# Patient Record
Sex: MALE | Race: WHITE
[De-identification: names, ages, dates, MRNs, and addresses within clinical notes are randomized per-mention and may not be internally consistent; named-entity substitution may affect disease eponyms.]

---

## 2017-10-11 ENCOUNTER — HOSPITAL ENCOUNTER (OUTPATIENT)
Dept: HOSPITAL 92 - LABBT | Age: 52
Discharge: HOME | End: 2017-10-11
Attending: ORTHOPAEDIC SURGERY
Payer: COMMERCIAL

## 2017-10-11 DIAGNOSIS — M75.101: ICD-10-CM

## 2017-10-11 DIAGNOSIS — Z01.818: Primary | ICD-10-CM

## 2017-10-11 LAB
ANION GAP SERPL CALC-SCNC: 13 MMOL/L (ref 10–20)
BASOPHILS # BLD AUTO: 0.1 THOU/UL (ref 0–0.2)
BASOPHILS NFR BLD AUTO: 0.9 % (ref 0–1)
BUN SERPL-MCNC: 13 MG/DL (ref 8.4–25.7)
CALCIUM SERPL-MCNC: 9.2 MG/DL (ref 7.8–10.44)
CHLORIDE SERPL-SCNC: 101 MMOL/L (ref 98–107)
CO2 SERPL-SCNC: 26 MMOL/L (ref 22–29)
CREAT CL PREDICTED SERPL C-G-VRATE: 0 ML/MIN (ref 70–130)
EOSINOPHIL # BLD AUTO: 0.3 THOU/UL (ref 0–0.7)
EOSINOPHIL NFR BLD AUTO: 2.9 % (ref 0–10)
HCT VFR BLD CALC: 55.1 % (ref 42–52)
LYMPHOCYTES # BLD: 3.2 THOU/UL (ref 1.2–3.4)
LYMPHOCYTES NFR BLD AUTO: 26.4 % (ref 21–51)
MONOCYTES # BLD AUTO: 0.9 THOU/UL (ref 0.11–0.59)
MONOCYTES NFR BLD AUTO: 7.4 % (ref 0–10)
NEUTROPHILS # BLD AUTO: 7.5 THOU/UL (ref 1.4–6.5)
RBC # BLD AUTO: 6.16 MILL/UL (ref 4.7–6.1)
WBC # BLD AUTO: 12 THOU/UL (ref 4.8–10.8)

## 2017-10-11 PROCEDURE — 85025 COMPLETE CBC W/AUTO DIFF WBC: CPT

## 2017-10-11 PROCEDURE — 80048 BASIC METABOLIC PNL TOTAL CA: CPT

## 2017-10-11 PROCEDURE — 93010 ELECTROCARDIOGRAM REPORT: CPT

## 2017-10-11 PROCEDURE — 93005 ELECTROCARDIOGRAM TRACING: CPT

## 2017-10-13 ENCOUNTER — HOSPITAL ENCOUNTER (OUTPATIENT)
Dept: HOSPITAL 92 - SDC | Age: 52
End: 2017-10-13
Attending: ORTHOPAEDIC SURGERY
Payer: COMMERCIAL

## 2017-10-13 VITALS — BODY MASS INDEX: 44.1 KG/M2

## 2017-10-13 DIAGNOSIS — M75.41: Primary | ICD-10-CM

## 2017-10-13 DIAGNOSIS — S46.111A: ICD-10-CM

## 2017-10-13 DIAGNOSIS — Z80.9: ICD-10-CM

## 2017-10-13 DIAGNOSIS — Z79.899: ICD-10-CM

## 2017-10-13 DIAGNOSIS — Z96.1: ICD-10-CM

## 2017-10-13 DIAGNOSIS — K21.9: ICD-10-CM

## 2017-10-13 DIAGNOSIS — I10: ICD-10-CM

## 2017-10-13 DIAGNOSIS — E78.00: ICD-10-CM

## 2017-10-13 DIAGNOSIS — Z98.890: ICD-10-CM

## 2017-10-13 DIAGNOSIS — Z79.84: ICD-10-CM

## 2017-10-13 DIAGNOSIS — Z82.3: ICD-10-CM

## 2017-10-13 DIAGNOSIS — Z95.1: ICD-10-CM

## 2017-10-13 DIAGNOSIS — Z90.49: ICD-10-CM

## 2017-10-13 DIAGNOSIS — Z79.82: ICD-10-CM

## 2017-10-13 DIAGNOSIS — M75.101: ICD-10-CM

## 2017-10-13 DIAGNOSIS — Z88.1: ICD-10-CM

## 2017-10-13 PROCEDURE — A4306 DRUG DELIVERY SYSTEM <=50 ML: HCPCS

## 2017-10-13 PROCEDURE — 0LQ10ZZ REPAIR RIGHT SHOULDER TENDON, OPEN APPROACH: ICD-10-PCS | Performed by: ORTHOPAEDIC SURGERY

## 2017-10-13 PROCEDURE — 0LS30ZZ REPOSITION RIGHT UPPER ARM TENDON, OPEN APPROACH: ICD-10-PCS | Performed by: ORTHOPAEDIC SURGERY

## 2017-10-13 PROCEDURE — 0RHJ04Z INSERTION OF INTERNAL FIXATION DEVICE INTO RIGHT SHOULDER JOINT, OPEN APPROACH: ICD-10-PCS | Performed by: ORTHOPAEDIC SURGERY

## 2017-10-13 PROCEDURE — C1713 ANCHOR/SCREW BN/BN,TIS/BN: HCPCS

## 2017-10-13 NOTE — OP
DATE OF PROCEDURE:  10/13/2017

 

PREOPERATIVE DIAGNOSES:  Right shoulder impingement with rotator cuff tear and degenerative labral t
ear leading to biceps instability.

 

POSTOPERATIVE DIAGNOSES:  Right shoulder impingement with rotator cuff tear and degenerative labral 
tear leading to biceps instability.

 

PROCEDURE PERFORMED:  Right shoulder open subacromial decompression followed by open rotator cuff re
pair, followed by open biceps tenodesis.

 

SURGEON:  Alonzo Carey M.D.

 

ASSISTANT:  Sinan Sanders PA-C.

 

BLOOD LOSS:  Approximately 50 mL.

 

COMPLICATIONS:  None.

 

IMPLANTS:  We used 2 double loaded titanium anchors as well as two 4.75 BioComposite SwiveLocks with
 rotator cuff tear and we used a 7 x 23 BioComposite Bio-Tenodesis screw.

 

DISPOSITION:  He did go to recovery in stable condition.

 

CONDITION:  He had general anesthetic, he also had a block.

 

INDICATIONS:  This is a 52-year-old male who has been having problems with his right shoulder with p
ain and weakness and he has failed nonoperative treatment at this time.  At this time, he wished his
 rotator cuff repaired.

 

DESCRIPTION OF PROCEDURE:  After all appropriate consent forms were explained and signed, he was lane
en to the operating room and at this time and was given a general anesthetic.  Once the level of ane
sthesia was appropriate, he was placed in modified beach chair position with all bony prominences we
ll-padded.  At this time, the right shoulder and upper extremity were then prepped and draped in the
 standard surgical fashion.  Incision line was made with a 10 blade down through skin.  Bovie was us
ed to coagulate any brisk venous bleeding.  At this time, full thickness deltoid and deltoid fascia 
was taken off the anterior acromion and a Hector was used to protect the underlying rotator cuff, 
a saw was used to perform anterior inferior acromioplasty.  Rasp was used to smooth off the edges.  
At this time, we then removed our bursa which was on top of the underlying rotator cuff.  Once this 
was removed, we were able to visualize our cuff tear.  All the edges were cleaned up with a knife as
 well as a rongeur.  There was an undersurface delamination noted in the posterior aspect of the sup
raspinatus going on into the infraspinatus.  A portion of this was debrided and we then passed some 
0 Vicryl sutures in mattress fashion to sew the bottom layer to the top layer closing this down.  At
 this time, we then turned our attention to find the biceps tendon.  This was tagged, cut out from i
nside the joint.  We then opened up the bicipital groove, coagulating any brisk venous bleeding.  We
 then sewed and cut off the intra-articular portion of the biceps tendon.  We then placed our pin, d
rilled with a 7 mm reamer to a depth of 25 and placed our BioComposite Bio-Tenodesis screw in standa
rd fashion.  Stitches were tied over top so the screw could not back out and these were then cut.  A
t this time, our biceps was tenodesed and we then turned our attention to repairing our rotator cuff
.  The greater tuberosity had all soft tissue removed from it to get to good bleeding punctate bone.
  We then thoroughly irrigated and dried.  We then placed two double loaded titanium anchors right o
ff the articular surface and passed these threads through the rotator cuff in mattress fashion using
 a free needle.  These were then sequentially tied and we then took 4 strands each and placed two 4.
75 BioComposite swivel locks for double row repair laterally down the arm.  This was punched and fransisco
riana in standard fashion and this gave us a nice double row repair.  We then took the arm through ful
l range of motion and found it to be nice and stable and have free range of motion.  We then thoroug
hly irrigated and dried.  We then used multiple interrupted #1 Ethibond sutures to repair our deltoi
d through the acromion bone.  We then ran a large Vicryl to close the remaining deltoid fascia, 2-0 
Vicryl and surgical staples were then used to close skin.  Bulky sterile dressing was applied.  The 
patient was awakened and taken to the recovery room in stable condition.  All counts were correct at
 the end of the case.  He received preoperative IV antibiotics.

## 2020-08-05 ENCOUNTER — HOSPITAL ENCOUNTER (OUTPATIENT)
Dept: HOSPITAL 92 - RAD | Age: 55
Discharge: HOME | End: 2020-08-05
Attending: ORTHOPAEDIC SURGERY
Payer: COMMERCIAL

## 2020-08-05 DIAGNOSIS — M19.012: ICD-10-CM

## 2020-08-05 DIAGNOSIS — M75.122: ICD-10-CM

## 2020-08-05 DIAGNOSIS — M25.512: Primary | ICD-10-CM

## 2020-08-05 PROCEDURE — 23350 INJECTION FOR SHOULDER X-RAY: CPT

## 2020-08-05 NOTE — CT
LEFT SHOULDER CT SCAN POST ARTHROGRAM CONTRAST:

 

HISTORY: 

Left shoulder pain.

 

Examination performed following a left shoulder arthrogram.

 

FINDINGS: 

Significant arthrosis changes of the AC joint with undersurface spurring of the distal clavicle and a
 prominent undersurface spur of the lateral acromion with some mild lateral acromion downsloping.  Th
ere is a complete full-thickness moderately retracted supraspinatus tendon tear initially at the foot
plate and then extending more towards the level of the magic angle with retraction back to the level 
of the mid humeral dome.  There also appears to be an undersurface and minimally delaminating tear of
 the infraspinatus tendon.  The intraarticular biceps tendon is somewhat poorly defined.  Minimal thi
ckening of the subscapularis tendon possibly representing some tendinopathy.  There is some narrowing
 of the glenohumeral joint space, evidence for arthrosis.  Moderate muscle volume loss of the suprasp
inatus muscle.  

 

IMPRESSION: 

Full-thickness retracted tear of the supraspinatus tendon with an undersurface and delaminating tear 
of the infraspinatus tendon.

 

Fairly marked muscle volume loss of the supraspinatus muscle.

 

Evidence for some narrowing of the glenohumeral joint space, evidence for arthrosis.

 

Evidence for some thickening of the subscapularis tendon possibly representing some tendinopathy.

 

Poorly defined biceps tendon.

 

Evidence for some blunting and distinction of the glenoid labrum.

 

POS: AH

## 2020-08-05 NOTE — RAD
Arthrogram left shoulder



HISTORY: Internal derangement.



FINDINGS: After explaining the procedure and answering all questions, the anterior aspect the left sh
oulder was prepped and draped in usual sterile fashion.



Sterile technique, buffered local anesthesia, fluoroscopic guidance, and an anterior approach were us
ed to carefully advance the tip of a 22-gauge spinal needle to the joint capsule at the level of

the humeral head.



A total of 8 cc liquid containing normal saline, 1% lidocaine, iodinated contrast, and small amount o
f epinephrine were then instilled into the joint capsule under fluoroscopic control. Needle was

removed. Contrast appeared to extend beyond the expected confines of the rotator cuff into the subacr
omial bursa.



Fluoroscopy time 0.4 minutes.



Patient tolerated procedure well and was transferred to CT in good condition for further imaging.



  



IMPRESSION :

Technically successful left shoulder arthrogram with evidence of full-thickness rotator cuff tear. CT
 is pending.



Reported By: DARRYL Mancia 

Electronically Signed:  8/5/2020 1:29 PM

## 2020-08-28 ENCOUNTER — HOSPITAL ENCOUNTER (OUTPATIENT)
Dept: HOSPITAL 92 - SDC | Age: 55
Discharge: HOME | End: 2020-08-28
Attending: ORTHOPAEDIC SURGERY
Payer: COMMERCIAL

## 2020-08-28 VITALS — BODY MASS INDEX: 43.1 KG/M2

## 2020-08-28 DIAGNOSIS — K21.9: ICD-10-CM

## 2020-08-28 DIAGNOSIS — M65.812: ICD-10-CM

## 2020-08-28 DIAGNOSIS — Z98.890: ICD-10-CM

## 2020-08-28 DIAGNOSIS — I10: ICD-10-CM

## 2020-08-28 DIAGNOSIS — Z95.1: ICD-10-CM

## 2020-08-28 DIAGNOSIS — M75.102: Primary | ICD-10-CM

## 2020-08-28 DIAGNOSIS — Z88.1: ICD-10-CM

## 2020-08-28 DIAGNOSIS — S43.432A: ICD-10-CM

## 2020-08-28 DIAGNOSIS — S46.112A: ICD-10-CM

## 2020-08-28 DIAGNOSIS — Z79.899: ICD-10-CM

## 2020-08-28 DIAGNOSIS — Z79.82: ICD-10-CM

## 2020-08-28 DIAGNOSIS — F17.210: ICD-10-CM

## 2020-08-28 DIAGNOSIS — M25.812: ICD-10-CM

## 2020-08-28 PROCEDURE — 0LQ24ZZ REPAIR LEFT SHOULDER TENDON, PERCUTANEOUS ENDOSCOPIC APPROACH: ICD-10-PCS | Performed by: ORTHOPAEDIC SURGERY

## 2020-08-28 PROCEDURE — C1713 ANCHOR/SCREW BN/BN,TIS/BN: HCPCS

## 2020-08-28 PROCEDURE — 0RNK4ZZ RELEASE LEFT SHOULDER JOINT, PERCUTANEOUS ENDOSCOPIC APPROACH: ICD-10-PCS | Performed by: ORTHOPAEDIC SURGERY

## 2020-08-28 PROCEDURE — 0RHK04Z INSERTION OF INTERNAL FIXATION DEVICE INTO LEFT SHOULDER JOINT, OPEN APPROACH: ICD-10-PCS | Performed by: ORTHOPAEDIC SURGERY

## 2020-08-28 PROCEDURE — A4306 DRUG DELIVERY SYSTEM <=50 ML: HCPCS

## 2020-08-28 PROCEDURE — 0LS40ZZ REPOSITION LEFT UPPER ARM TENDON, OPEN APPROACH: ICD-10-PCS | Performed by: ORTHOPAEDIC SURGERY

## 2020-08-28 NOTE — OP
DATE OF PROCEDURE:  08/28/2020



PREOPERATIVE DIAGNOSES:  Left shoulder impingement rotator cuff tear and biceps

tendon tearing and instability. 



POSTOPERATIVE DIAGNOSES:  Left shoulder impingement rotator cuff tear and biceps

tendon tearing and instability. 



PROCEDURES PERFORMED:  

1. Left shoulder arthroscopy with arthroscopic subacromial decompression.

2. Arthroscopic rotator cuff repair.

3. Open biceps tenodesis.



SURGEON:  Alonzo Carey MD



ASSISTANT:  Patrice Sanders PA-C



ESTIMATED BLOOD LOSS:  50.



COMPLICATIONS:  None.



ANESTHESIA:  The patient had general anesthetic, also had a preoperative block.



IMPLANTS:  Into the left shoulder, one triple-loaded titanium rotator cuff anchor,

multiple sutures for side-to-side closure and one 7 x 23 BioComposite Bio-Tenodesis

screw. 



DISPOSITION:  He did go to recovery room in stable condition.



INDICATIONS:  This is a 55-year-old male, who comes in complaining of pain and

weakness.  On MRI, he was found to have a large retracted cuff tear and significant

labral tearing and biceps pathology.  At this time, he opted to have surgery. 



DESCRIPTION OF PROCEDURE:  After all appropriate consent forms were explained and

signed, Mr. Cornejo was taken to the operating room and at this time was given

general anesthetic.  Once the level of anesthesia was appropriate, he was rolled

into the right lateral decubitus position with all bony prominences well padded.

Axillary roll was placed underneath the right axilla.  The beanbag was inflated to

hold in this position.  The arm was then taken through full range of motion and at

this time, the arm was then suspended with 20 pounds in standard arthroscopic

fashion.  The left shoulder and upper extremity were than prepped and draped in

standard surgical fashion.  Bony anatomical landmarks were drawn out and the

subacromial space was infiltrated with Marcaine with epinephrine.  Posterior portal

was established and the scope was placed into the shoulder joint.  Anterior working

portal was then made using a needle localization technique.  Diagnostic arthroscopy

commenced in the glenohumeral joint.  The articular surface of the humeral head did

have some mild chondral wear anteriorly, but for the most part was in good

condition.  The glenoid was in good condition.  There was no loose bodies noted in

the axillary pouch.  There was significant synovitis and a degenerative SLAP tear

with instability of the biceps tendon being easily demonstrated.  At this time, a

cannula was placed anteriorly.  We used a combination of the shaver, [employer]

to get rid of our synovitis.  We then went ahead and used an 18-gauge needle to

samuels the biceps tendon and place the stitch through.  Arthroscopic scissors were

used to cut the biceps off its labral insertion.  This area was then debrided.  At

this time, we removed the scope and replaced in subacromial space.  Lateral working

portal was made.  Bursa was removed, so we could visualize its large retracted tear.

 Supraspinatus was pulled off in its entirety, retracted and posterior to this, I

made a D-type tear.  Once the bursa was removed, and we had freed up the cuff from

the bottom and the top surfaces, we were able to pull the rotator cuff just beyond

the articular edge.  At this time, all soft tissue was removed off the bony

insertion area.  Shaver was used to slightly decorticate this area.  The edges of

the intratendinous tearing were then freshened up with a shaver as well.  PassPort

button was placed laterally and through this, the Scorpion device was then used to

place 4 side-to-side stitches to close our extension posteriorly.  Once this was

done, we were able to use a needle through a separate stab incision and placed a

triple-loaded titanium rotator cuff anchor.  Secondary to the stiffness of the

tendon and not being able to pull it all the way down laterally, we decided to make

this a single-row repair and therefore, the scorpion was used to place each of the

three suture strands through the cuff in a simple fashion.  These were then tied,

giving us a nice repair of our supraspinatus to the bone.  At this time, scope was

removed, shoulder was drained.  A 15 blade was used to then incise down through

skin.  Bovie was used to coagulate any brisk venous bleeding.  A sharp dissection

was used through the fascia of the deltoid.  Dissection was used to open this up and

to get down to the underlying transverse humeral ligament.  This was opened up and

the biceps tendon was pulled out into the wound.  The biceps tendon was then sutured

in standard fashion and the intra-articular portion of the biceps was cut off and

removed from the field.  A pin was placed into the bone, followed by reaming with a

7.5 mm reamer to a depth of 25.  A 7 x 23 BioComposite Bio-Tenodesis screw was then

placed in standard fashion.  Sutures were tied over top of this, so that the screw

could not back out.  We then thoroughly irrigated and dried and we then allowed our

deltoid to close upon itself.  Running Vicryl was used to close our deltoid fascia

and 2-0 Vicryl and sutures were used to close skin.  At this time, a bulky sterile

dressing was then applied.  The patient was then awakened, was taken to the recovery

room in stable condition.  All counts were correct at the end of the case and he did

receive preoperative IV antibiotics.  Assistant surgeon was present throughout the

open biceps tenodesis portion of the procedure. 







Job ID:  [id number]